# Patient Record
Sex: FEMALE | Race: BLACK OR AFRICAN AMERICAN | NOT HISPANIC OR LATINO | URBAN - METROPOLITAN AREA
[De-identification: names, ages, dates, MRNs, and addresses within clinical notes are randomized per-mention and may not be internally consistent; named-entity substitution may affect disease eponyms.]

---

## 2024-10-27 ENCOUNTER — EMERGENCY (EMERGENCY)
Facility: HOSPITAL | Age: 74
LOS: 1 days | Discharge: HOME | End: 2024-10-27
Attending: EMERGENCY MEDICINE | Admitting: STUDENT IN AN ORGANIZED HEALTH CARE EDUCATION/TRAINING PROGRAM
Payer: COMMERCIAL

## 2024-10-27 VITALS
OXYGEN SATURATION: 99 % | SYSTOLIC BLOOD PRESSURE: 175 MMHG | DIASTOLIC BLOOD PRESSURE: 94 MMHG | TEMPERATURE: 99 F | WEIGHT: 130.07 LBS | HEART RATE: 90 BPM | RESPIRATION RATE: 16 BRPM

## 2024-10-27 LAB
ALBUMIN SERPL ELPH-MCNC: 4.3 G/DL — SIGNIFICANT CHANGE UP (ref 3.3–5)
ALP SERPL-CCNC: 146 U/L — HIGH (ref 40–120)
ALT FLD-CCNC: 18 U/L — SIGNIFICANT CHANGE UP (ref 4–33)
ANION GAP SERPL CALC-SCNC: 14 MMOL/L — SIGNIFICANT CHANGE UP (ref 7–14)
APPEARANCE UR: CLEAR — SIGNIFICANT CHANGE UP
AST SERPL-CCNC: 20 U/L — SIGNIFICANT CHANGE UP (ref 4–32)
BACTERIA # UR AUTO: NEGATIVE /HPF — SIGNIFICANT CHANGE UP
BASOPHILS # BLD AUTO: 0.02 K/UL — SIGNIFICANT CHANGE UP (ref 0–0.2)
BASOPHILS NFR BLD AUTO: 0.3 % — SIGNIFICANT CHANGE UP (ref 0–2)
BILIRUB SERPL-MCNC: 0.4 MG/DL — SIGNIFICANT CHANGE UP (ref 0.2–1.2)
BILIRUB UR-MCNC: NEGATIVE — SIGNIFICANT CHANGE UP
BUN SERPL-MCNC: 15 MG/DL — SIGNIFICANT CHANGE UP (ref 7–23)
CALCIUM SERPL-MCNC: 9.9 MG/DL — SIGNIFICANT CHANGE UP (ref 8.4–10.5)
CAST: 0 /LPF — SIGNIFICANT CHANGE UP (ref 0–4)
CHLORIDE SERPL-SCNC: 101 MMOL/L — SIGNIFICANT CHANGE UP (ref 98–107)
CO2 SERPL-SCNC: 24 MMOL/L — SIGNIFICANT CHANGE UP (ref 22–31)
COLOR SPEC: YELLOW — SIGNIFICANT CHANGE UP
CREAT SERPL-MCNC: 0.7 MG/DL — SIGNIFICANT CHANGE UP (ref 0.5–1.3)
DIFF PNL FLD: ABNORMAL
EGFR: 92 ML/MIN/1.73M2 — SIGNIFICANT CHANGE UP
EOSINOPHIL # BLD AUTO: 0.03 K/UL — SIGNIFICANT CHANGE UP (ref 0–0.5)
EOSINOPHIL NFR BLD AUTO: 0.5 % — SIGNIFICANT CHANGE UP (ref 0–6)
GLUCOSE SERPL-MCNC: 130 MG/DL — HIGH (ref 70–99)
GLUCOSE UR QL: NEGATIVE MG/DL — SIGNIFICANT CHANGE UP
HCT VFR BLD CALC: 44 % — SIGNIFICANT CHANGE UP (ref 34.5–45)
HGB BLD-MCNC: 14.7 G/DL — SIGNIFICANT CHANGE UP (ref 11.5–15.5)
IANC: 3.34 K/UL — SIGNIFICANT CHANGE UP (ref 1.8–7.4)
IMM GRANULOCYTES NFR BLD AUTO: 0.3 % — SIGNIFICANT CHANGE UP (ref 0–0.9)
KETONES UR-MCNC: NEGATIVE MG/DL — SIGNIFICANT CHANGE UP
LEUKOCYTE ESTERASE UR-ACNC: NEGATIVE — SIGNIFICANT CHANGE UP
LYMPHOCYTES # BLD AUTO: 2.52 K/UL — SIGNIFICANT CHANGE UP (ref 1–3.3)
LYMPHOCYTES # BLD AUTO: 39.8 % — SIGNIFICANT CHANGE UP (ref 13–44)
MCHC RBC-ENTMCNC: 29.9 PG — SIGNIFICANT CHANGE UP (ref 27–34)
MCHC RBC-ENTMCNC: 33.4 GM/DL — SIGNIFICANT CHANGE UP (ref 32–36)
MCV RBC AUTO: 89.4 FL — SIGNIFICANT CHANGE UP (ref 80–100)
MONOCYTES # BLD AUTO: 0.4 K/UL — SIGNIFICANT CHANGE UP (ref 0–0.9)
MONOCYTES NFR BLD AUTO: 6.3 % — SIGNIFICANT CHANGE UP (ref 2–14)
NEUTROPHILS # BLD AUTO: 3.34 K/UL — SIGNIFICANT CHANGE UP (ref 1.8–7.4)
NEUTROPHILS NFR BLD AUTO: 52.8 % — SIGNIFICANT CHANGE UP (ref 43–77)
NITRITE UR-MCNC: NEGATIVE — SIGNIFICANT CHANGE UP
NRBC # BLD: 0 /100 WBCS — SIGNIFICANT CHANGE UP (ref 0–0)
NRBC # FLD: 0 K/UL — SIGNIFICANT CHANGE UP (ref 0–0)
PH UR: 7 — SIGNIFICANT CHANGE UP (ref 5–8)
PLATELET # BLD AUTO: 244 K/UL — SIGNIFICANT CHANGE UP (ref 150–400)
POTASSIUM SERPL-MCNC: 3.8 MMOL/L — SIGNIFICANT CHANGE UP (ref 3.5–5.3)
POTASSIUM SERPL-SCNC: 3.8 MMOL/L — SIGNIFICANT CHANGE UP (ref 3.5–5.3)
PROT SERPL-MCNC: 8 G/DL — SIGNIFICANT CHANGE UP (ref 6–8.3)
PROT UR-MCNC: NEGATIVE MG/DL — SIGNIFICANT CHANGE UP
RBC # BLD: 4.92 M/UL — SIGNIFICANT CHANGE UP (ref 3.8–5.2)
RBC # FLD: 12.2 % — SIGNIFICANT CHANGE UP (ref 10.3–14.5)
RBC CASTS # UR COMP ASSIST: 0 /HPF — SIGNIFICANT CHANGE UP (ref 0–4)
SODIUM SERPL-SCNC: 139 MMOL/L — SIGNIFICANT CHANGE UP (ref 135–145)
SP GR SPEC: 1.01 — SIGNIFICANT CHANGE UP (ref 1–1.03)
SQUAMOUS # UR AUTO: 1 /HPF — SIGNIFICANT CHANGE UP (ref 0–5)
TROPONIN T, HIGH SENSITIVITY RESULT: <6 NG/L — SIGNIFICANT CHANGE UP
TSH SERPL-MCNC: 0.57 UIU/ML — SIGNIFICANT CHANGE UP (ref 0.27–4.2)
UROBILINOGEN FLD QL: 0.2 MG/DL — SIGNIFICANT CHANGE UP (ref 0.2–1)
WBC # BLD: 6.33 K/UL — SIGNIFICANT CHANGE UP (ref 3.8–10.5)
WBC # FLD AUTO: 6.33 K/UL — SIGNIFICANT CHANGE UP (ref 3.8–10.5)
WBC UR QL: 3 /HPF — SIGNIFICANT CHANGE UP (ref 0–5)

## 2024-10-27 PROCEDURE — 99223 1ST HOSP IP/OBS HIGH 75: CPT

## 2024-10-27 PROCEDURE — 71046 X-RAY EXAM CHEST 2 VIEWS: CPT | Mod: 26

## 2024-10-27 PROCEDURE — 93010 ELECTROCARDIOGRAM REPORT: CPT

## 2024-10-27 RX ORDER — AMLODIPINE BESYLATE 5 MG
10 TABLET ORAL ONCE
Refills: 0 | Status: COMPLETED | OUTPATIENT
Start: 2024-10-27 | End: 2024-10-27

## 2024-10-27 RX ORDER — SODIUM CHLORIDE 0.9 % (FLUSH) 0.9 %
1000 SYRINGE (ML) INJECTION ONCE
Refills: 0 | Status: COMPLETED | OUTPATIENT
Start: 2024-10-27 | End: 2024-10-27

## 2024-10-27 RX ORDER — AMLODIPINE BESYLATE 5 MG
10 TABLET ORAL AT BEDTIME
Refills: 0 | Status: DISCONTINUED | OUTPATIENT
Start: 2024-10-28 | End: 2024-10-30

## 2024-10-27 RX ADMIN — Medication 10 MILLIGRAM(S): at 19:32

## 2024-10-27 RX ADMIN — Medication 1000 MILLILITER(S): at 12:44

## 2024-10-27 NOTE — ED CDU PROVIDER INITIAL DAY NOTE - CLINICAL SUMMARY MEDICAL DECISION MAKING FREE TEXT BOX
75-year-old female medical history significant for glaucoma, HTN, not on antihypertensives, presenting with 1 day of elevated PE worsened today with left "squeezing arm pain". Physical exam notable for well-appearing female in no acute distress.  Heart: RRR, lungs: CTA, abdomen soft, nontender.  CN II-XII intact, normal strength and sensation throughout.  Given HPI, concerning differentials include but not limited to ACS given reported squeezing arm pain.  Additionally hyperthyroidism, acute HTN.  EKG initially obtained notable for NSR.  Given complaints we will obtain basic screening workup including CBC, CMP to evaluate for electrolyte derangement, anemia, leukocytosis.  Will obtain troponin to evaluate for potential cardiac event. Will provide IV fluids and reassess.      CDU PA- trop neg, ekg NSR w/o signs of ischemia- CDU for tele, stress test and tele doc of the day sandraal

## 2024-10-27 NOTE — ED ADULT NURSE NOTE - IS THE PATIENT ABLE TO BE SCREENED?
3/14/2022    Patient: Babak Rivas   YOB: 1946   Date of Visit: 3/14/2022     Alejandro Christy MD  [place] [id number]  Via Fax: [phone]    Dear Alejandro Christy MD,      Thank you for referring Ms. Teo Zelaya to [facility] for evaluation. My notes for this consultation are attached. If you have questions, please do not hesitate to call me. I look forward to following your patient along with you.       Sincerely,    Vandana Powell, DO
Yes

## 2024-10-27 NOTE — ED ADULT TRIAGE NOTE - BP NONINVASIVE DIASTOLIC (MM HG)
Dr. Uriel Linton    Patient has appointment with you on 7/12. I relayed this information to her. Do you want pt to keep her appointment? Please advise. [phone]

## 2024-10-27 NOTE — ED PROVIDER NOTE - ATTENDING CONTRIBUTION TO CARE
DR. KWON, ATTENDING MD-  I performed a face to face bedside interview with the patient regarding history of present illness, review of symptoms and past medical history. I completed an independent physical exam.  I have discussed the patient's plan of care with the resident.   Documentation as above in the note.     74-year-old female history of hypertension not on medications here with complaint of left arm squeezing sensation in the setting of elevated blood pressures for 2 days.  She denies chest pain palpitations syncope shortness of breath.  No prior history of such symptoms.  Does not see a cardiologist.  Will evaluate for atypical ACS, anemia, electrolyte abnormality.  Obtain EKG CBC CMP troponin chest x-ray, offer observation for further cardiac evaluation versus close outpatient follow up.

## 2024-10-27 NOTE — ED PROVIDER NOTE - PHYSICAL EXAMINATION
GEN: Patient awake and alert. No acute distress, non-toxic.  Head: normocephalic, atraumatic.  Neck: Nontender, full ROM  Eyes: PERRLA b/l. EOMI, no scleral icterus, no conjunctival injection. Moist mucous membranes.  ENT: Throat without exudates, uvula midline, no vesicles.   CARDIAC: RRR. No murmur, rubs, or gallops. No peripheral edema noted.  PULM: CTA B/L no wheeze, rales or rhonchi. No signs of respiratory distress, no accessory muscle usage or nasal flaring.  ABD: Soft, nontender, nondistended. No rebound, no involuntary guarding  : No CVA tenderness,  MSK: Moving all extremities spontaneously. 5/5 strength and full ROM in all extremities  NEURO: A&Ox3, no focal neurological deficits, CN 2-12 grossly intact. Following simple commands. FTN coordination intact.  SKIN: Warm, dry, no rash

## 2024-10-27 NOTE — ED ADULT NURSE REASSESSMENT NOTE - NS ED NURSE REASSESS COMMENT FT1
Break RN: Pt is A&Ox4, resting in stretcher with no complaints at this time. Respirations even and unlabored, chest rise equal b/l. Pt denies chest pain, SOB, abdominal pain, N/V/D, h/a, dizziness, numbness/tingling or any urinary symptoms at this time. No acute distress noted. Safety maintained throughout. Report given to CDU DINO Lainez. Pt to be transported to spot 1.

## 2024-10-27 NOTE — ED CDU PROVIDER INITIAL DAY NOTE - OBJECTIVE STATEMENT
75-year-old female medical history significant for glaucoma, HTN, not on antihypertensives, presenting with 1 day of elevated PE worsened today with left "squeezing arm pain".  Patient denies prior similar episodes.  She denies HA, changes in vision, lightheadedness or dizziness, CP, SOB, N/B, F/C, abdominal pain, changes in urination or bowel movement.  She denies recent long distance travel or sick contacts.  Patient states checking blood pressure yesterday, recorded at 140s/90s.  Today given continued arm squeezing and weakness checked blood pressure again this morning noted to be 170s/100s.  Given continued symptoms patient presented to the emergency department for evaluation.    CDU PA- agree with above, labs and ekg negative. sent to CDU for tele and stress test

## 2024-10-27 NOTE — ED ADULT TRIAGE NOTE - CHIEF COMPLAINT QUOTE
Pt c/o elevated blood pressure and left arm pain since yesterday. denies recent injuries, headache, blurry vision, chest pain, sob, denies hx of htn. pmhx: glaucoma

## 2024-10-27 NOTE — ED CDU PROVIDER INITIAL DAY NOTE - ATTENDING APP SHARED VISIT CONTRIBUTION OF CARE
CDU MD KWON:  I performed a face to face bedside interview with patient regarding history of present illness, review of symptoms and past medical history. I completed an independent physical exam.  I have discussed patient's plan of care with PA.   I agree with note as stated above, having amended the EMR as needed to reflect my findings. I have discussed the assessment and plan of care.  This includes during the time I functioned as the attending physician for this patient.    75 y/o female h/o htn not on meds p/w lue squeezing pain x2 days.  CDU for stress telemetry cards eval.

## 2024-10-27 NOTE — ED PROVIDER NOTE - PROGRESS NOTE DETAILS
Patient resting comfortably in bed.  Does endorse feeling better.  Informed patient of results of lab work and option for cardiac stress testing tomorrow.  Patient is amenable to CDU for cardiac stress testing and echo tomorrow.

## 2024-10-27 NOTE — ED PROVIDER NOTE - CLINICAL SUMMARY MEDICAL DECISION MAKING FREE TEXT BOX
75-year-old female medical history significant for glaucoma, HTN, not on antihypertensives, presenting with 1 day of elevated PE worsened today with left "squeezing arm pain". Physical exam notable for well-appearing female in no acute distress.  Heart: RRR, lungs: CTA, abdomen soft, nontender.  CN II-XII intact, normal strength and sensation throughout.  Given HPI, concerning differentials include but not limited to ACS given reported squeezing arm pain.  Additionally hyperthyroidism, acute HTN.  EKG initially obtained notable for NSR.  Given complaints we will obtain basic screening workup including CBC, CMP to evaluate for electrolyte derangement, anemia, leukocytosis.  Will obtain troponin to evaluate for potential cardiac event. Will provide IV fluids and reassess.

## 2024-10-27 NOTE — ED PROVIDER NOTE - OBJECTIVE STATEMENT
75-year-old female medical history significant for glaucoma, HTN, not on antihypertensives, presenting with 1 day of elevated PE worsened today with left "squeezing arm pain".  Patient denies prior similar episodes.  She denies HA, changes in vision, lightheadedness or dizziness, CP, SOB, N/B, F/C, abdominal pain, changes in urination or bowel movement.  She denies recent long distance travel or sick contacts.  Patient states checking blood pressure yesterday, recorded at 140s/90s.  Today given continued arm squeezing and weakness checked blood pressure again this morning noted to be 170s/100s.  Given continued symptoms patient presented to the emergency department for evaluation.

## 2024-10-27 NOTE — ED ADULT NURSE NOTE - OBJECTIVE STATEMENT
Er pt coming with left arm pain started yest. with hypertension noted yest. due to her pain, denies Hypertension, some blur vision and left side chest pain above the left breast,   denies SOB, no upper and lower Extremities weakness, no slur speech,    Lungs clear, respiration equal, 18-20b/min,  Oxygen Sat 100% RA,   labs sent, IV to Right AC 20g angio cath place,   On CM with NSR, Hr 71-76b/min,      Pending dispo.      Gayle Harvey RN

## 2024-10-28 VITALS
HEART RATE: 83 BPM | SYSTOLIC BLOOD PRESSURE: 130 MMHG | OXYGEN SATURATION: 98 % | TEMPERATURE: 98 F | RESPIRATION RATE: 18 BRPM | DIASTOLIC BLOOD PRESSURE: 77 MMHG

## 2024-10-28 LAB — ADD ON TEST-SPECIMEN IN LAB: SIGNIFICANT CHANGE UP

## 2024-10-28 PROCEDURE — 93018 CV STRESS TEST I&R ONLY: CPT | Mod: GC,MC

## 2024-10-28 PROCEDURE — 99239 HOSP IP/OBS DSCHRG MGMT >30: CPT

## 2024-10-28 PROCEDURE — 78451 HT MUSCLE IMAGE SPECT SING: CPT | Mod: 26,MC

## 2024-10-28 PROCEDURE — 93016 CV STRESS TEST SUPVJ ONLY: CPT | Mod: GC,MC

## 2024-10-28 NOTE — ED CDU PROVIDER SUBSEQUENT DAY NOTE - ATTENDING APP SHARED VISIT CONTRIBUTION OF CARE
CDU subsequent day note by Dr. Ender Lombardi, CDU attending for this morning:  Signout received from DESMOND Head. I personally saw and evaluated this patient after receiving signout. Patient is well-appearing, in no acute distress, speaking full clear sentences.  Denies CP at this time.    Plan:  -Stress  -Cardiology recs  -Dispo pending

## 2024-10-28 NOTE — ED CDU PROVIDER DISPOSITION NOTE - NSFOLLOWUPINSTRUCTIONS_ED_ALL_ED_FT
Follow-up with your primary care doctor in 1 to 2 days.  Follow-up with cardiology Dr. Fuentes in 1 to 2 days call to schedule an appointment.  Take amlodipine 10 mg 1 tablet orally once a day.  Return to the ER for any persistent/worsening or new symptoms, chest pain, shortness of breath, weakness, dizziness or any concerning symptoms.

## 2024-10-28 NOTE — ED CDU PROVIDER SUBSEQUENT DAY NOTE - PROGRESS NOTE DETAILS
DESMOND Padron: 75-year-old female with a history of glaucoma, hypertension presented to the emergency department complaining of left arm pain and elevated blood pressure readings at home.  Patient placed in CDU for cardiac monitoring and stress test.  Patient seen and cleared by cardiology Dr. Fuentes advised follow-up outpatient.  Patient started on Amlodipine 10 mg in the ED will continue outpatient and advised follow-up with PMD.  Patient ambulatory without difficulty, tolerating p.o., discharge and results reviewed with patient.

## 2024-10-28 NOTE — CONSULT NOTE ADULT - SUBJECTIVE AND OBJECTIVE BOX
Cardiovascular Disease Initial Evaluation  DATE OF SERVICE: 10-28-24 @ 09:40    CHIEF COMPLAINT: Chest pain    HISTORY OF PRESENT ILLNESS:    This is a 75 year old woman with glaucoma and HTN who presented to [facility] on 10/27/2024 with left "squeezing arm pain".  Patient denies prior similar episodes.  She denies HA, changes in vision, lightheadedness or dizziness, CP, SOB, N/B, F/C, abdominal pain, changes in urination or bowel movement.  She denies recent long distance travel or sick contacts.  Patient states checking blood pressure yesterday, recorded at 140s/90s.  Given continued arm squeezing and weakness, she checked blood pressure again and it was noted to be 170s/100s.  Given continued symptoms patient presented to the emergency department for evaluation.          Allergies  No Known Allergies        MEDICATIONS:  amLODIPine   Tablet 10 milliGRAM(s) Oral at bedtime                  PAST MEDICAL & SURGICAL HISTORY:  Hypertension      No significant past surgical history          FAMILY HISTORY:      SOCIAL HISTORY:    The patient is a nonsmoker       REVIEW OF SYSTEMS:  See HPI, otherwise complete 14 point review of systems negative    PHYSICAL EXAM:  T(C): 36.8 (10-28-24 @ 06:32), Max: 37.2 (10-27-24 @ 11:31)  HR: 74 (10-28-24 @ 06:32) (71 - 90)  BP: 143/82 (10-28-24 @ 06:32) (125/77 - 193/99)  RR: 18 (10-28-24 @ 06:32) (16 - 18)  SpO2: 100% (10-28-24 @ 06:32) (97% - 100%)  Wt(kg): --  I&O's Summary      Appearance: No Acute Distress; resting comfortably  HEENT:  Normal oral mucosa, PERRL, EOMI	  Cardiovascular: Normal S1 S2, No JVD, No murmurs/rubs/gallops  Respiratory: Normal respiratory effort; Lungs clear to auscultation bilaterally  Gastrointestinal:  Soft, Non-tender, + BS	  Skin: No rashes, No ecchymoses, No cyanosis	  Neurologic: Non-focal; no weakness  Extremities: No clubbing, cyanosis or edema  Vascular: Peripheral pulses palpable 2+ bilaterally  Psychiatry: A & O x 3, Mood & affect appropriate    Laboratory Data:	 	    CBC Full  -  ( 27 Oct 2024 12:25 )  WBC Count : 6.33 K/uL  Hemoglobin : 14.7 g/dL  Hematocrit : 44.0 %  Platelet Count - Automated : 244 K/uL  Mean Cell Volume : 89.4 fL  Mean Cell Hemoglobin : 29.9 pg  Mean Cell Hemoglobin Concentration : 33.4 gm/dL  Auto Neutrophil # : 3.34 K/uL  Auto Lymphocyte # : 2.52 K/uL  Auto Monocyte # : 0.40 K/uL  Auto Eosinophil # : 0.03 K/uL  Auto Basophil # : 0.02 K/uL  Auto Neutrophil % : 52.8 %  Auto Lymphocyte % : 39.8 %  Auto Monocyte % : 6.3 %  Auto Eosinophil % : 0.5 %  Auto Basophil % : 0.3 %    10-27    139  |  101  |  15  ----------------------------<  130[H]  3.8   |  24  |  0.70    Ca    9.9      27 Oct 2024 12:25    TPro  8.0  /  Alb  4.3  /  TBili  0.4  /  DBili  x   /  AST  20  /  ALT  18  /  AlkPhos  146[H]  10-27        Interpretation of Telemetry: Sinus	    ECG:  	Normal sinus rhythm    Assessment: 75 year old woman with glaucoma and HTN presents with chest pain and HTN urgency.    Plan of Care:    #Chest pain-  Ms. Jenkins has ruled out for ACS and EKG is nonischemic.  Symptoms likely secondary to HTN urgency.  Awaiting NST, as ordered by CDU.  I agree with amlodipine.  Gentle BP control given patient's age.      #ACP (advance care planning)-  CPT 58396  Advanced care planning was discussed with the patient in the stress lab.    Lab and EKG were discussed in detail and all questions were answered.        76 minutes spent on total encounter; more than 50% of the visit was spent counseling and/or coordinating care by the attending physician.   	  Davy Fuentes MD [facility]  Cardiovascular Diseases  [phone]

## 2024-10-28 NOTE — ED CDU PROVIDER DISPOSITION NOTE - CARE PROVIDER_API CALL
Davy Fuentes.  Internal Medicine  [address] [place] [address]  Phone: [phone]  Fax: [phone]  Follow Up Time:

## 2024-10-28 NOTE — ED CDU PROVIDER DISPOSITION NOTE - ATTENDING CONTRIBUTION TO CARE
I have discussed the patient's case presentation with the PA. I have also personally performed a face-to-face evaluation of the patient. I agree with the PA's assessment and plan.    Stable for discharge home w/ outpatient cardiology followup

## 2024-10-28 NOTE — ED CDU PROVIDER SUBSEQUENT DAY NOTE - HISTORY
75-year-old female medical history significant for glaucoma, HTN, not on antihypertensives, presenting with 1 day of elevated PE worsened today with left "squeezing arm pain".  Patient denies prior similar episodes.  She denies HA, changes in vision, lightheadedness or dizziness, CP, SOB, N/B, F/C, abdominal pain, changes in urination or bowel movement.  She denies recent long distance travel or sick contacts.  Patient states checking blood pressure yesterday, recorded at 140s/90s.  Today given continued arm squeezing and weakness checked blood pressure again this morning noted to be 170s/100s.  Given continued symptoms patient presented to the emergency department for evaluation.    CDU PA- agree with above, labs and ekg negative. sent to CDU for tele and stress test. The patient has been stable while in CDU, the patient's BP improved from an SBP of 190s to 130s after PO Norvasc was initiated. The risks and benefits were explained to the patient. The patient has no acute CP or L arm pain as she states the sx resolved while in the ED. Will continue to monitor on telemetry.

## 2024-10-28 NOTE — ED CDU PROVIDER SUBSEQUENT DAY NOTE - CLINICAL SUMMARY MEDICAL DECISION MAKING FREE TEXT BOX
75-year-old female medical history significant for glaucoma, HTN, not on antihypertensives, presenting with 1 day of elevated PE worsened today with left "squeezing arm pain".  Patient denies prior similar episodes.  She denies HA, changes in vision, lightheadedness or dizziness, CP, SOB, N/B, F/C, abdominal pain, changes in urination or bowel movement.  The patient has been stable while in CDU, pending a stress ryan and Cardiology consultation to eval for a cardiogenic etiology of the patient's sx. Pt's BP is improved, will continue to monitor on telemetry.

## 2024-10-28 NOTE — ED CDU PROVIDER SUBSEQUENT DAY NOTE - WR INTERPRETATION DATE TIME  1
Pt complains of increased pain to Rt knee. She is scheduled to get a shot in RT and LT knee on 8/5 and went today to try and get it early but they were unable to get her in.
27-Oct-2024 15:01

## 2024-10-28 NOTE — ED CDU PROVIDER DISPOSITION NOTE - PATIENT PORTAL LINK FT
You can access the FollowMyHealth Patient Portal offered by [facility] by registering at the following website: [email or web address]. By joining [employer]’s FollowMyHealth portal, you will also be able to view your health information using other applications (apps) compatible with our system.

## 2024-10-29 RX ORDER — AMLODIPINE BESYLATE 5 MG
1 TABLET ORAL
Qty: 30 | Refills: 0
Start: 2024-10-29